# Patient Record
Sex: FEMALE | Race: WHITE | NOT HISPANIC OR LATINO | URBAN - METROPOLITAN AREA
[De-identification: names, ages, dates, MRNs, and addresses within clinical notes are randomized per-mention and may not be internally consistent; named-entity substitution may affect disease eponyms.]

---

## 2022-09-23 ENCOUNTER — TELEPHONE ENCOUNTER (OUTPATIENT)
Dept: URBAN - METROPOLITAN AREA CLINIC 92 | Facility: CLINIC | Age: 42
End: 2022-09-23

## 2022-09-28 ENCOUNTER — OFFICE VISIT (OUTPATIENT)
Dept: URBAN - NONMETROPOLITAN AREA CLINIC 2 | Facility: CLINIC | Age: 42
End: 2022-09-28
Payer: COMMERCIAL

## 2022-09-28 ENCOUNTER — LAB OUTSIDE AN ENCOUNTER (OUTPATIENT)
Dept: URBAN - NONMETROPOLITAN AREA CLINIC 2 | Facility: CLINIC | Age: 42
End: 2022-09-28

## 2022-09-28 ENCOUNTER — WEB ENCOUNTER (OUTPATIENT)
Dept: URBAN - NONMETROPOLITAN AREA CLINIC 2 | Facility: CLINIC | Age: 42
End: 2022-09-28

## 2022-09-28 VITALS
BODY MASS INDEX: 25.83 KG/M2 | WEIGHT: 155 LBS | DIASTOLIC BLOOD PRESSURE: 84 MMHG | SYSTOLIC BLOOD PRESSURE: 139 MMHG | HEART RATE: 71 BPM | HEIGHT: 65 IN

## 2022-09-28 DIAGNOSIS — R19.7 DIARRHEA, UNSPECIFIED TYPE: ICD-10-CM

## 2022-09-28 DIAGNOSIS — K21.9 GERD WITHOUT ESOPHAGITIS: ICD-10-CM

## 2022-09-28 DIAGNOSIS — K58.0 IRRITABLE BOWEL SYNDROME WITH DIARRHEA: ICD-10-CM

## 2022-09-28 PROCEDURE — 99204 OFFICE O/P NEW MOD 45 MIN: CPT | Performed by: NURSE PRACTITIONER

## 2022-09-28 RX ORDER — OMEPRAZOLE 40 MG/1
1 CAPSULE 30 MINUTES BEFORE MORNING MEAL CAPSULE, DELAYED RELEASE ORAL ONCE A DAY
Qty: 90 | Refills: 3 | OUTPATIENT
Start: 2022-09-28

## 2022-09-28 RX ORDER — ESTROGENS, CONJUGATED 1.25 MG/1
1 TABLET TABLET, FILM COATED ORAL ONCE A DAY
Refills: 0 | Status: ACTIVE | COMMUNITY
Start: 1900-01-01

## 2022-09-28 RX ORDER — SODIUM PICOSULFATE, MAGNESIUM OXIDE, AND ANHYDROUS CITRIC ACID 10; 3.5; 12 MG/16.2G; G/16.2G; G/16.2G
AS DIRECTED POWDER, METERED ORAL
Qty: 1 | Refills: 0 | Status: DISCONTINUED | COMMUNITY
Start: 2016-11-10

## 2022-09-28 RX ORDER — COLESTIPOL HYDROCHLORIDE 1 G/1
1 TABLET 30MINS PRIOR TO A MEAL TABLET, FILM COATED ORAL BID
Qty: 60 | Refills: 6 | OUTPATIENT
Start: 2022-09-28

## 2022-09-28 RX ORDER — RIFAXIMIN 550 MG/1
1 TABLET TABLET ORAL THREE TIMES A DAY
Qty: 42 TABLET | Refills: 2 | OUTPATIENT
Start: 2022-09-28 | End: 2022-11-08

## 2022-09-28 NOTE — HPI-OTHER HISTORIES
3/12/2019   Ms. Anna Ardon is here by urgent request from Dr. Morillo. She has been feeling unwell for about 2 weeks. She has been having swollen lymph nodes in her neck and fever and chills. 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  She has diarrhea after she eats worse if she eats out. This is not a new issue. This was present in 2016 when I saw her last. She had an abdominal exam and was tender in her RLQ with her PCP. She had a CT scan that was negative except for diverticulosis. She denies any abdominal pain today. She denies any nausea or vomiting. She had labs that are still pending per her report. CS  3/14/2019 Colonoscopy: normal. Path negative

## 2022-09-28 NOTE — HPI-TODAY'S VISIT:
9/28/2022 Ms. Anna Ardon is a 42 year old female here for diarrhea, gas/bloating, and stomach burning. She was last seen in 2019 for abdominal pain and diarrhea. She had normal labs, stool studies, and colonoscopy. She got her GB out later that year. She has continued to have diarrhea. This is multiple times a day with no form. A few months ago, she ate a biscuit and had a lot of bloating, gas and upset stomach. This lasted for about a week. Since this time, she has had burning in her stomach. She will have a lot of gas and bloating. Her belch smells like sulfa. Her mother told her it smelled like old blood. She had recent blood work with her PCP and her Hbg was normal. She denies any melena. Her pain seems to be worse at night. She has been taking tums and vitaliy seltzer. CS

## 2022-10-17 ENCOUNTER — OFFICE VISIT (OUTPATIENT)
Dept: URBAN - NONMETROPOLITAN AREA SURGERY CENTER 1 | Facility: SURGERY CENTER | Age: 42
End: 2022-10-17

## 2022-11-11 ENCOUNTER — DASHBOARD ENCOUNTERS (OUTPATIENT)
Age: 42
End: 2022-11-11

## 2022-11-11 ENCOUNTER — OFFICE VISIT (OUTPATIENT)
Dept: URBAN - NONMETROPOLITAN AREA CLINIC 2 | Facility: CLINIC | Age: 42
End: 2022-11-11
Payer: COMMERCIAL

## 2022-11-11 DIAGNOSIS — R19.7 DIARRHEA, UNSPECIFIED TYPE: ICD-10-CM

## 2022-11-11 DIAGNOSIS — K21.9 GERD WITHOUT ESOPHAGITIS: ICD-10-CM

## 2022-11-11 DIAGNOSIS — K58.0 IRRITABLE BOWEL SYNDROME WITH DIARRHEA: ICD-10-CM

## 2022-11-11 PROCEDURE — 99441 PHONE E/M BY PHYS 5-10 MIN: CPT | Performed by: NURSE PRACTITIONER

## 2022-11-11 RX ORDER — COLESTIPOL HYDROCHLORIDE 1 G/1
1 TABLET 30MINS PRIOR TO A MEAL TABLET, FILM COATED ORAL BID
Qty: 60 | Refills: 6 | Status: ACTIVE | COMMUNITY
Start: 2022-09-28

## 2022-11-11 RX ORDER — OMEPRAZOLE 40 MG/1
1 CAPSULE 30 MINUTES BEFORE MORNING MEAL CAPSULE, DELAYED RELEASE ORAL ONCE A DAY
Qty: 90 | Refills: 3 | OUTPATIENT

## 2022-11-11 RX ORDER — COLESTIPOL HYDROCHLORIDE 1 G/1
1 TABLET 30MINS PRIOR TO A MEAL TABLET, FILM COATED ORAL BID
Qty: 60 | Refills: 6 | OUTPATIENT

## 2022-11-11 RX ORDER — ESTROGENS, CONJUGATED 1.25 MG/1
1 TABLET TABLET, FILM COATED ORAL ONCE A DAY
Refills: 0 | Status: ACTIVE | COMMUNITY
Start: 1900-01-01

## 2022-11-11 RX ORDER — OMEPRAZOLE 40 MG/1
1 CAPSULE 30 MINUTES BEFORE MORNING MEAL CAPSULE, DELAYED RELEASE ORAL ONCE A DAY
Qty: 90 | Refills: 3 | Status: ACTIVE | COMMUNITY
Start: 2022-09-28

## 2022-11-11 NOTE — HPI-OTHER HISTORIES
3/12/2019   Ms. Anna Ardon is here by urgent request from Dr. Morillo. She has been feeling unwell for about 2 weeks. She has been having swollen lymph nodes in her neck and fever and chills. 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  She has diarrhea after she eats worse if she eats out. This is not a new issue. This was present in 2016 when I saw her last. She had an abdominal exam and was tender in her RLQ with her PCP. She had a CT scan that was negative except for diverticulosis. She denies any abdominal pain today. She denies any nausea or vomiting. She had labs that are still pending per her report. CS  3/14/2019 Colonoscopy: normal. Path negative  9/28/2022 Ms. Anna Ardon is a 42 year old female here for diarrhea, gas/bloating, and stomach burning. She was last seen in 2019 for abdominal pain and diarrhea. She had normal labs, stool studies, and colonoscopy. She got her GB out later that year. She has continued to have diarrhea. This is multiple times a day with no form. A few months ago, she ate a biscuit and had a lot of bloating, gas and upset stomach. This lasted for about a week. Since this time, she has had burning in her stomach. She will have a lot of gas and bloating. Her belch smells like sulfa. Her mother told her it smelled like old blood. She had recent blood work with her PCP and her Hbg was normal. She denies any melena. Her pain seems to be worse at night. She has been taking tums and vitaliy seltzer. CS

## 2022-11-11 NOTE — HPI-TODAY'S VISIT:
11/11/2022 Ms. Anna Ardon is evaluated vie telephone to f/u on diarrhea, gas/bloating, and stomach burning. A few months ago, she ate a biscuit and had a lot of bloating, gas and upset stomach. This lasted for about a week. Since this time, she has had burning in her stomach. She will have a lot of gas and bloating. Her belch smells like sulfa. She was given a round of xifaxan, colestipol for bile salt diarrhea, and omeprazole for gastritis. Today, her diarrhea is better and only when she eats trigger foods like high fat and dairy. She has cut out spicy foods too. She is feeling better but every 2 weeks she will again have the bloating and belching. She would like to proceed with EGD as scheduled. CS

## 2022-11-22 ENCOUNTER — OFFICE VISIT (OUTPATIENT)
Dept: URBAN - NONMETROPOLITAN AREA SURGERY CENTER 1 | Facility: SURGERY CENTER | Age: 42
End: 2022-11-22

## 2022-11-22 ENCOUNTER — CLAIMS CREATED FROM THE CLAIM WINDOW (OUTPATIENT)
Dept: URBAN - NONMETROPOLITAN AREA SURGERY CENTER 1 | Facility: SURGERY CENTER | Age: 42
End: 2022-11-22
Payer: COMMERCIAL

## 2022-11-22 DIAGNOSIS — K29.60 ADENOPAPILLOMATOSIS GASTRICA: ICD-10-CM

## 2022-11-22 PROCEDURE — G8907 PT DOC NO EVENTS ON DISCHARG: HCPCS | Performed by: INTERNAL MEDICINE

## 2022-11-22 PROCEDURE — 43239 EGD BIOPSY SINGLE/MULTIPLE: CPT | Performed by: INTERNAL MEDICINE

## 2023-01-26 ENCOUNTER — OFFICE VISIT (OUTPATIENT)
Dept: URBAN - NONMETROPOLITAN AREA CLINIC 13 | Facility: CLINIC | Age: 43
End: 2023-01-26

## 2023-01-26 PROBLEM — 266435005: Status: ACTIVE | Noted: 2022-09-28

## 2023-01-26 PROBLEM — 197125005: Status: ACTIVE | Noted: 2022-09-28

## 2023-01-26 RX ORDER — ESTROGENS, CONJUGATED 1.25 MG/1
1 TABLET TABLET, FILM COATED ORAL ONCE A DAY
Refills: 0 | Status: ACTIVE | COMMUNITY
Start: 1900-01-01

## 2023-01-26 RX ORDER — OMEPRAZOLE 40 MG/1
1 CAPSULE 30 MINUTES BEFORE MORNING MEAL CAPSULE, DELAYED RELEASE ORAL ONCE A DAY
Qty: 90 | Refills: 3 | OUTPATIENT

## 2023-01-26 RX ORDER — COLESTIPOL HYDROCHLORIDE 1 G/1
1 TABLET 30MINS PRIOR TO A MEAL TABLET, FILM COATED ORAL BID
Qty: 60 | Refills: 6 | Status: ACTIVE | COMMUNITY

## 2023-01-26 RX ORDER — COLESTIPOL HYDROCHLORIDE 1 G/1
1 TABLET 30MINS PRIOR TO A MEAL TABLET, FILM COATED ORAL BID
Qty: 60 | Refills: 6 | OUTPATIENT

## 2023-01-26 RX ORDER — OMEPRAZOLE 40 MG/1
1 CAPSULE 30 MINUTES BEFORE MORNING MEAL CAPSULE, DELAYED RELEASE ORAL ONCE A DAY
Qty: 90 | Refills: 3 | Status: ACTIVE | COMMUNITY

## 2023-01-26 NOTE — HPI-OTHER HISTORIES
3/12/2019   Ms. Anna Ardon is here by urgent request from Dr. Morillo. She has been feeling unwell for about 2 weeks. She has been having swollen lymph nodes in her neck and fever and chills. 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  She has diarrhea after she eats worse if she eats out. This is not a new issue. This was present in 2016 when I saw her last. She had an abdominal exam and was tender in her RLQ with her PCP. She had a CT scan that was negative except for diverticulosis. She denies any abdominal pain today. She denies any nausea or vomiting. She had labs that are still pending per her report. CS  3/14/2019 Colonoscopy: normal. Path negative  9/28/2022 Ms. Anna Ardon is a 42 year old female here for diarrhea, gas/bloating, and stomach burning. She was last seen in 2019 for abdominal pain and diarrhea. She had normal labs, stool studies, and colonoscopy. She got her GB out later that year. She has continued to have diarrhea. This is multiple times a day with no form. A few months ago, she ate a biscuit and had a lot of bloating, gas and upset stomach. This lasted for about a week. Since this time, she has had burning in her stomach. She will have a lot of gas and bloating. Her belch smells like sulfa. Her mother told her it smelled like old blood. She had recent blood work with her PCP and her Hbg was normal. She denies any melena. Her pain seems to be worse at night. She has been taking tums and vitaliy seltzer. CS  11/11/2022 Ms. Anna Ardon is evaluated vie telephone to f/u on diarrhea, gas/bloating, and stomach burning. A few months ago, she ate a biscuit and had a lot of bloating, gas and upset stomach. This lasted for about a week. Since this time, she has had burning in her stomach. She will have a lot of gas and bloating. Her belch smells like sulfa. She was given a round of xifaxan, colestipol for bile salt diarrhea, and omeprazole for gastritis. Today, her diarrhea is better and only when she eats trigger foods like high fat and dairy. She has cut out spicy foods too. She is feeling better but every 2 weeks she will again have the bloating and belching. She would like to proceed with EGD as scheduled. CS  11/22/2022 EGD: 3cm hiatal hernia. Path negative for celiac and gastritis

## 2023-01-26 NOTE — HPI-TODAY'S VISIT:
1/26/2023 Ms. Anna Ardon is here for f/u on diarrhea, gas/bloating, and stomach burning. A few months ago, she ate a biscuit and had a lot of bloating, gas and upset stomach. This lasted for about a week. Since this time, she has had burning in her stomach. She will have a lot of gas and bloating. Her belch smells like sulfa. She was given a round of xifaxan, colestipol for bile salt diarrhea, and omeprazole for gastritis. Today, her diarrhea is better and only when she eats trigger foods like high fat and dairy. She has cut out spicy foods too. She is feeling better but every 2 weeks she will again have the bloating and belching. She would like to proceed with EGD as scheduled. CS

## 2023-01-27 ENCOUNTER — OFFICE VISIT (OUTPATIENT)
Dept: URBAN - NONMETROPOLITAN AREA CLINIC 2 | Facility: CLINIC | Age: 43
End: 2023-01-27